# Patient Record
Sex: MALE | Race: WHITE | NOT HISPANIC OR LATINO | Employment: FULL TIME | URBAN - METROPOLITAN AREA
[De-identification: names, ages, dates, MRNs, and addresses within clinical notes are randomized per-mention and may not be internally consistent; named-entity substitution may affect disease eponyms.]

---

## 2014-05-16 RX ORDER — TRETINOIN 0.5 MG/G
CREAM TOPICAL
Qty: 45 | Refills: 6 | Status: DISCONTINUED
Start: 2014-05-16 | End: 2014-12-18

## 2014-05-16 RX ORDER — PREDNISONE 20 MG/1
TABLET ORAL
Qty: 6 | Refills: 0 | Status: DISCONTINUED
Start: 2014-05-16 | End: 2015-01-28

## 2014-05-16 RX ORDER — ISOTRETINOIN 30 MG/1
CAPSULE, LIQUID FILLED ORAL
Qty: 60 | Refills: 0 | Status: DISCONTINUED
Start: 2014-05-16 | End: 2015-01-28

## 2014-05-16 RX ORDER — ISOTRETINOIN 30 MG/1
CAPSULE, LIQUID FILLED ORAL
Qty: 60 | Refills: 0 | Status: DISCONTINUED
Start: 2014-05-16 | End: 2014-05-16

## 2020-10-07 ENCOUNTER — HOSPITAL ENCOUNTER (OUTPATIENT)
Dept: PREADMISSION TESTING | Facility: HOSPITAL | Age: 40
Discharge: HOME | End: 2020-10-07
Attending: UROLOGY

## 2020-10-07 ENCOUNTER — OFFICE VISIT CONVERTED (OUTPATIENT)
Dept: SURGERY | Facility: CLINIC | Age: 40
End: 2020-10-07
Attending: NURSE PRACTITIONER

## 2020-10-07 LAB — SARS-COV-2 RNA SPEC QL NAA+PROBE: NOT DETECTED

## 2020-10-08 ENCOUNTER — HOSPITAL ENCOUNTER (OUTPATIENT)
Dept: PERIOP | Facility: HOSPITAL | Age: 40
Setting detail: HOSPITAL OUTPATIENT SURGERY
Discharge: HOME | End: 2020-10-08
Attending: UROLOGY

## 2020-10-14 ENCOUNTER — OFFICE VISIT CONVERTED (OUTPATIENT)
Dept: SURGERY | Facility: CLINIC | Age: 40
End: 2020-10-14
Attending: NURSE PRACTITIONER

## 2020-10-15 ENCOUNTER — HOSPITAL ENCOUNTER (OUTPATIENT)
Dept: LAB | Facility: HOSPITAL | Age: 40
Discharge: HOME | End: 2020-10-15
Attending: INTERNAL MEDICINE

## 2020-10-15 LAB
ALBUMIN SERPL-MCNC: 4.1 G/DL (ref 3.5–5)
ALBUMIN/GLOB SERPL: 1.3 {RATIO} (ref 1.4–2.6)
ALP SERPL-CCNC: 70 U/L (ref 53–128)
ALT SERPL-CCNC: 15 U/L (ref 10–40)
ANION GAP SERPL CALC-SCNC: 13 MMOL/L (ref 8–19)
AST SERPL-CCNC: 12 U/L (ref 15–50)
BILIRUB SERPL-MCNC: 0.42 MG/DL (ref 0.2–1.3)
BNP SERPL-MCNC: 26 PG/ML (ref 0–450)
BUN SERPL-MCNC: 16 MG/DL (ref 5–25)
BUN/CREAT SERPL: 13 {RATIO} (ref 6–20)
CALCIUM SERPL-MCNC: 9.3 MG/DL (ref 8.7–10.4)
CHLORIDE SERPL-SCNC: 100 MMOL/L (ref 99–111)
CHOLEST SERPL-MCNC: 138 MG/DL (ref 107–200)
CHOLEST/HDLC SERPL: 4.6 {RATIO} (ref 3–6)
CONV CO2: 31 MMOL/L (ref 22–32)
CONV TOTAL PROTEIN: 7.2 G/DL (ref 6.3–8.2)
CREAT UR-MCNC: 1.2 MG/DL (ref 0.7–1.2)
GFR SERPLBLD BASED ON 1.73 SQ M-ARVRAT: >60 ML/MIN/{1.73_M2}
GLOBULIN UR ELPH-MCNC: 3.1 G/DL (ref 2–3.5)
GLUCOSE SERPL-MCNC: 100 MG/DL (ref 70–99)
HDLC SERPL-MCNC: 30 MG/DL (ref 40–60)
LDLC SERPL CALC-MCNC: 86 MG/DL (ref 70–100)
OSMOLALITY SERPL CALC.SUM OF ELEC: 291 MOSM/KG (ref 273–304)
POTASSIUM SERPL-SCNC: 4.1 MMOL/L (ref 3.5–5.3)
SODIUM SERPL-SCNC: 140 MMOL/L (ref 135–147)
T4 FREE SERPL-MCNC: 1.1 NG/DL (ref 0.9–1.8)
TRIGL SERPL-MCNC: 109 MG/DL (ref 40–150)
TSH SERPL-ACNC: 5.98 M[IU]/L (ref 0.27–4.2)
VLDLC SERPL-MCNC: 22 MG/DL (ref 5–37)

## 2020-10-19 ENCOUNTER — OFFICE VISIT CONVERTED (OUTPATIENT)
Dept: UROLOGY | Facility: CLINIC | Age: 40
End: 2020-10-19
Attending: UROLOGY

## 2020-10-19 ENCOUNTER — HOSPITAL ENCOUNTER (OUTPATIENT)
Dept: SURGERY | Facility: CLINIC | Age: 40
Discharge: HOME | End: 2020-10-19
Attending: UROLOGY

## 2020-10-19 ENCOUNTER — HOSPITAL ENCOUNTER (OUTPATIENT)
Dept: LAB | Facility: HOSPITAL | Age: 40
Discharge: HOME | End: 2020-10-19
Attending: UROLOGY

## 2020-10-19 LAB
ANION GAP SERPL CALC-SCNC: 16 MMOL/L (ref 8–19)
BASOPHILS # BLD AUTO: 0.07 10*3/UL (ref 0–0.2)
BASOPHILS NFR BLD AUTO: 0.7 % (ref 0–3)
BUN SERPL-MCNC: 17 MG/DL (ref 5–25)
BUN/CREAT SERPL: 13 {RATIO} (ref 6–20)
CALCIUM SERPL-MCNC: 10.1 MG/DL (ref 8.7–10.4)
CHLORIDE SERPL-SCNC: 100 MMOL/L (ref 99–111)
CONV ABS IMM GRAN: 0.03 10*3/UL (ref 0–0.2)
CONV CO2: 30 MMOL/L (ref 22–32)
CONV IMMATURE GRAN: 0.3 % (ref 0–1.8)
CREAT UR-MCNC: 1.35 MG/DL (ref 0.7–1.2)
DEPRECATED RDW RBC AUTO: 42.6 FL (ref 35.1–43.9)
EOSINOPHIL # BLD AUTO: 0.14 10*3/UL (ref 0–0.7)
EOSINOPHIL # BLD AUTO: 1.4 % (ref 0–7)
ERYTHROCYTE [DISTWIDTH] IN BLOOD BY AUTOMATED COUNT: 13.2 % (ref 11.6–14.4)
GFR SERPLBLD BASED ON 1.73 SQ M-ARVRAT: >60 ML/MIN/{1.73_M2}
GLUCOSE SERPL-MCNC: 134 MG/DL (ref 70–99)
HCT VFR BLD AUTO: 47.1 % (ref 42–52)
HGB BLD-MCNC: 15.3 G/DL (ref 14–18)
LYMPHOCYTES # BLD AUTO: 2.48 10*3/UL (ref 1–5)
LYMPHOCYTES NFR BLD AUTO: 24.8 % (ref 20–45)
MCH RBC QN AUTO: 28.7 PG (ref 27–31)
MCHC RBC AUTO-ENTMCNC: 32.5 G/DL (ref 33–37)
MCV RBC AUTO: 88.2 FL (ref 80–96)
MONOCYTES # BLD AUTO: 0.48 10*3/UL (ref 0.2–1.2)
MONOCYTES NFR BLD AUTO: 4.8 % (ref 3–10)
NEUTROPHILS # BLD AUTO: 6.82 10*3/UL (ref 2–8)
NEUTROPHILS NFR BLD AUTO: 68 % (ref 30–85)
NRBC CBCN: 0 % (ref 0–0.7)
OSMOLALITY SERPL CALC.SUM OF ELEC: 298 MOSM/KG (ref 273–304)
PLATELET # BLD AUTO: 342 10*3/UL (ref 130–400)
PMV BLD AUTO: 10.8 FL (ref 9.4–12.4)
POTASSIUM SERPL-SCNC: 3.9 MMOL/L (ref 3.5–5.3)
RBC # BLD AUTO: 5.34 10*6/UL (ref 4.7–6.1)
SODIUM SERPL-SCNC: 142 MMOL/L (ref 135–147)
WBC # BLD AUTO: 10.02 10*3/UL (ref 4.8–10.8)

## 2020-10-21 LAB — BACTERIA UR CULT: NORMAL

## 2020-10-22 ENCOUNTER — HOSPITAL ENCOUNTER (OUTPATIENT)
Dept: CT IMAGING | Facility: HOSPITAL | Age: 40
Discharge: HOME | End: 2020-10-22
Attending: UROLOGY

## 2021-02-23 ENCOUNTER — HOSPITAL ENCOUNTER (OUTPATIENT)
Dept: LAB | Facility: HOSPITAL | Age: 41
Discharge: HOME | End: 2021-02-23
Attending: INTERNAL MEDICINE

## 2021-02-23 LAB
ALBUMIN SERPL-MCNC: 4.2 G/DL (ref 3.5–5)
ALBUMIN/GLOB SERPL: 1.3 {RATIO} (ref 1.4–2.6)
ALP SERPL-CCNC: 75 U/L (ref 53–128)
ALT SERPL-CCNC: 34 U/L (ref 10–40)
ANION GAP SERPL CALC-SCNC: 15 MMOL/L (ref 8–19)
AST SERPL-CCNC: 20 U/L (ref 15–50)
BILIRUB SERPL-MCNC: 0.33 MG/DL (ref 0.2–1.3)
BUN SERPL-MCNC: 20 MG/DL (ref 5–25)
BUN/CREAT SERPL: 17 {RATIO} (ref 6–20)
CALCIUM SERPL-MCNC: 9.3 MG/DL (ref 8.7–10.4)
CHLORIDE SERPL-SCNC: 102 MMOL/L (ref 99–111)
CHOLEST SERPL-MCNC: 169 MG/DL (ref 107–200)
CHOLEST/HDLC SERPL: 5 {RATIO} (ref 3–6)
CONV CO2: 29 MMOL/L (ref 22–32)
CONV TOTAL PROTEIN: 7.4 G/DL (ref 6.3–8.2)
CREAT UR-MCNC: 1.16 MG/DL (ref 0.7–1.2)
GFR SERPLBLD BASED ON 1.73 SQ M-ARVRAT: >60 ML/MIN/{1.73_M2}
GLOBULIN UR ELPH-MCNC: 3.2 G/DL (ref 2–3.5)
GLUCOSE SERPL-MCNC: 124 MG/DL (ref 70–99)
HDLC SERPL-MCNC: 34 MG/DL (ref 40–60)
LDLC SERPL CALC-MCNC: 115 MG/DL (ref 70–100)
OSMOLALITY SERPL CALC.SUM OF ELEC: 298 MOSM/KG (ref 273–304)
POTASSIUM SERPL-SCNC: 4.2 MMOL/L (ref 3.5–5.3)
SODIUM SERPL-SCNC: 142 MMOL/L (ref 135–147)
TRIGL SERPL-MCNC: 99 MG/DL (ref 40–150)
VLDLC SERPL-MCNC: 20 MG/DL (ref 5–37)

## 2021-03-05 ENCOUNTER — OFFICE VISIT CONVERTED (OUTPATIENT)
Dept: INTERNAL MEDICINE | Facility: CLINIC | Age: 41
End: 2021-03-05
Attending: STUDENT IN AN ORGANIZED HEALTH CARE EDUCATION/TRAINING PROGRAM

## 2021-03-05 ENCOUNTER — CONVERSION ENCOUNTER (OUTPATIENT)
Dept: INTERNAL MEDICINE | Facility: CLINIC | Age: 41
End: 2021-03-05

## 2021-05-10 NOTE — H&P
"   History and Physical      Patient Name: Sky Reeves   Patient ID: [id number]   Sex: Male   YOB: 1980    Primary Care Provider: Van Albert MD    Visit Date: March 5, 2021    Provider: Van Albert MD   Location: [facility] [place]   Location Address: [address] [place] [address]; [address]  [place]  [address]   Location Phone: [phone]          Chief Complaint  · Establish care  · Depression  · PTSD      History Of Present Illness  Sky Reeves is a 40 year old /White male who presents for evaluation and treatment of:      here to establish care.  Last PCP- Dr Huerta.  Psych NP is Martin Nolan.  Reports considering switching PCPs.    Reports working on \"medical correction\" and unsatisfied with medical documentation of his current PCP.  Medical problems as follows.    LBP:  reports present 8-10 years.  Taking naproxen and tramadol  Reports he is concerned he is developing arthritis in hands as he is having trouble closing hands in the morning.  Reports mother has rheumatoid arthritis.  Reports LBP will at times radiate down right leg.    PTSD/anxiety disorder:    Has been seeing Mando Robles (spelling?) of [facility] since November.  Mando Nolan, a psych NP, who prescribed his psychiatric medications.  He did go to [facility] last fall for several months, but left their care as he felt they were not a good fit.    He denies any SI, and denies any previous suicide attempts.    HTN:      does not measure BP at home.      Obesity:    does not engage in regular physical exercise.  He is unaware of what size dress shirt he wears.  He reports that his ex-wife (whom he is currently living with) reports that he snores at night.  He reports feeling daytime fatigue.  Has never had a sleep study    Unspecified cardiac issue:    reports seeing a cardiologist (he is unsure of the name) for a fast heart beat.    Social History:    quit smoking 20 years " ago  denies ETOH or illicits  lives with ex-wife (they are trying to repair their relationship)    Health Maintenance:  Never had colonoscopy  Declines flu shot  Reports had labs performed 2 weeks ago by cardiologist       Past Medical History  Disease Name Date Onset Notes   Allergic rhinitis --  --    Arthritis --  --    Depression --  --    Essential hypertension 12/01/2015 --    High blood pressure --  --    Hypertension --  --    Kidney stones --  --    Low back pain --  --    Other seasonal allergic rhinitis 12/01/2015 --          Past Surgical History  Procedure Name Date Notes   Cystoscopy and ureteroscopy with retrograde pyelography or stent placement --  --          Medication List  Name Date Started Instructions   amlodipine 10 mg oral tablet  take 1 tablet (10 mg) by oral route once daily   buspirone 10 mg oral tablet  take 1 tablet (10 mg) by oral route 2 times per day   lisinopril-hydrochlorothiazide 20-12.5 mg oral tablet 03/05/2021 take 2 tablets by oral route once daily   metoprolol tartrate 50 mg oral tablet 03/05/2021 take 1 tablet by oral route 2 times a day for 30 days   naproxen 500 mg oral tablet 12/01/2015 TAKE ONE TABLET BY MOUTH TWICE A DAY WITH FOOD.   quetiapine 25 mg oral tablet  --    tramadol 50 mg oral tablet 03/05/2021 take 2 tablet by oral route in AM, 1 at noon and 2 at night.         Allergy List  Allergen Name Date Reaction Notes   NO KNOWN DRUG ALLERGIES --  --  --        Allergies Reconciled  Family Medical History  Disease Name Relative/Age Notes   -None  --    -Father's Family History Unknown Father/   Father   -Mother's Family History Unknown Mother/   Mother         Social History  Finding Status Start/Stop Quantity Notes   Alcohol Never --/-- --  --    Tobacco Former 16/26 1 PK PER DAY --          Review of Systems  · Constitutional  o Denies  o : fever, fatigue, weight loss, weight gain  · Cardiovascular  o Denies  o : lower extremity edema, claudication, chest pressure,  "palpitations  · Respiratory  o Denies  o : shortness of breath, wheezing, cough, hemoptysis, dyspnea on exertion  · Gastrointestinal  o Denies  o : nausea, vomiting, diarrhea, constipation, abdominal pain      Vitals  Date Time BP Position Site L\R Cuff Size HR RR TEMP (F) WT  HT  BMI kg/m2 BSA m2 O2 Sat FR L/min FiO2 HC       10/14/2020 01:27 PM       16  294lbs 16oz 6'  4\" 35.91 2.68       10/19/2020 12:22 PM       17  294lbs 16oz 6'  4\" 35.91 2.68       03/05/2021 11:27 /99 Sitting    85 - R  97.8 315lbs 16oz 6'  4\" 38.46 2.77 96 %  21%          Physical Examination     gen: NAD, well nourished  HEENT: NCAT, PERRL, EOMI, MMM w/ no erythema or exudate in oropharynx  Neck: no cervical LAD, no thyromegaly or palpable nodules  CV: RRR w/ no m/r/g  Resp: CTAB, nonlabored breathing  GI: soft, NTTP, on masses or HSM  Ext: no LE edema  MSK: no TTP T-spine or L-spine.  No TTP paraspinal muscles.  Negative straight leg test bilaterally  Neuro: 1+ patellar reflexes, AAO and answers questions appropriately, CN III IV and VI intact  Derm: no rash or lesions           Assessment  · Need for influenza vaccination     V04.81/Z23  -declines flu shot  · Annual physical exam     V70.0/Z00.00  -recommended PT for LBP, and sleep study as high index of suspicion for ANAYA  -reports would like to consider recommendations, and whether or not wishes to transfer care from Dr. Huerta to myself  -reports recent bloodwork at cardiology; will try to obtain  · Essential hypertension     401.9/I10  -above goal of less than 130/80  -compliant with medication  -recommended check BP at home or pharmacy 3 times a week for at least two weeks  -would adjust medication if home readings above goal  · Obesity     278.00/E66.9  -recommended sleep study  -if wishes to pursue care with me, would refer for sleep study  -will try to obtain cardiology labs and records  · Low back pain     724.2/M54.5  -negative straight leg test  -on naproxen and " tramadol  -recommended PT, says will consider recommendation    Problems Reconciled  Plan  · Orders  o ACO-14: Influenza immunization was not administered for reasons documented () - V04.81/Z23 - 03/05/2021  o ACO-39: Current medications updated and reviewed (, 1159F) - - 03/05/2021  · Medications  o Medications have been Reconciled  o Transition of Care or Provider Policy  · Instructions  o Flu vaccine declined.  o Reviewed health maintenance flowsheet and updated information. Orders were placed and/or patient's response was documented.  o Patient advised to monitor blood pressure (B/P) at home and journal readings. Patient informed that a B/P reading at home of more than 130/80 is considered hypertension. For readings greater fgez210/90 or higher patient is advised to follow up in the office with readings for management. Patient advised to limit sodium intake.  o Patient was educated/instructed on their diagnosis, treatment and medications prior to discharge from the clinic today.  · Disposition  o Return Visit Request in/on 1 month +/- 2 days (23214).            Electronically Signed by: Van Albert MD -Author on March 5, 2021 06:33:05 PM

## 2021-05-10 NOTE — H&P
History and Physical      Patient Name: Sky Reeves   Patient ID: [id number]   Sex: Male   YOB: 1980    Primary Care Provider: Carrie Huerta MD    Visit Date: October 7, 2020    Provider: BERNARD Martin   Location: [facility] [place] [facility]   Location Address: [address] [place] [address]  [id number]   Location Phone: [phone]          Chief Complaint  · Outpatient History & Physical / Surgical Orders      History Of Present Illness  [facility]  Outpatient History and Physical Surgical Orders  Preadmission Location: LOCATION Preadmission Time: TIME   Which Facility: [facility] Surgery Date: 10/08/2020 Preadmission Testing Date: 10/7/2020   Patient's Name: Sky Reeves YOB: 1980   Chief complaint/history present illness: Nephrolithiasis   Current Medication List: amlodipine 10 mg oral tablet, duloxetine 20 mg oral capsule,delayed release(DR/EC), Flomax 0.4 mg oral capsule, hydroxyzine HCl 10 mg oral tablet, ketorolac 10 mg oral tablet, lisinopril-hydrochlorothiazide 20-12.5 mg oral tablet, metoprolol tartrate 50 mg oral tablet, naproxen 500 mg oral tablet, ondansetron 4 mg oral tablet,disintegrating, and tramadol 50 mg oral tablet   Allergies: NO KNOWN DRUG ALLERGIES   Significant past medical: Allergic rhinitis, Arthritis, Depression, Essential hypertension, High blood pressure, Hypertension, Kidney stones, Low back pain, and Other seasonal allergic rhinitis   Past Surgical History: *No Past Surgical History   Examination of heart and lungs: Regular rate, rhythm, no murmur, gallop, rub, Breath sounds normal, no distress, and Abdomen soft, non-tender, BSx4 are positive         Past Medical History  Disease Name Date Onset Notes   Allergic rhinitis --  --    Arthritis --  --    Depression --  --    Essential hypertension 12/01/2015 --    High blood pressure --  --    Hypertension --  --    Kidney stones --  --    Low back pain --  --    Other  "seasonal allergic rhinitis 12/01/2015 --          Past Surgical History  Procedure Name Date Notes   *No Past Surgical History --  --          Medication List  Name Date Started Instructions   amlodipine 10 mg oral tablet  take 1 tablet (10 mg) by oral route once daily   duloxetine 20 mg oral capsule,delayed release(DR/EC)  take 1 capsule (20 mg) by oral route 2 times per day   Flomax 0.4 mg oral capsule  take 1 capsule (0.4 mg) by oral route once daily 1/2 hour following the same meal each day   hydroxyzine HCl 10 mg oral tablet  take 1 tablet by oral route daily   ketorolac 10 mg oral tablet  --    lisinopril-hydrochlorothiazide 20-12.5 mg oral tablet 12/01/2015 take 1 tablet by oral route once daily for 90 days   metoprolol tartrate 50 mg oral tablet  --    naproxen 500 mg oral tablet 12/01/2015 TAKE ONE TABLET BY MOUTH TWICE A DAY WITH FOOD.   ondansetron 4 mg oral tablet,disintegrating  --    tramadol 50 mg oral tablet 12/01/2015 take 1 tablet by oral route in AM, 1 at noon and 2 at night.         Allergy List  Allergen Name Date Reaction Notes   NO KNOWN DRUG ALLERGIES --  --  --        Allergies Reconciled  Family Medical History  Disease Name Relative/Age Notes   -None  --    -Father's Family History Unknown Father/   Father   -Mother's Family History Unknown Mother/   Mother         Social History  Finding Status Start/Stop Quantity Notes   Alcohol Never --/-- --  --    Tobacco Former 16/26 1 PK PER DAY --          Review of Systems  · Constitutional  o Denies  o : chills, fever  · Genitourinary  o Admits  o : burning with urination, frequency of urine, urgency with urine, kidney stones      Vitals  Date Time BP Position Site L\R Cuff Size HR RR TEMP (F) WT  HT  BMI kg/m2 BSA m2 O2 Sat FR L/min FiO2 HC       10/07/2020 11:51 AM       16  295lbs 8oz 6'  4\" 35.97 2.68                 Assessment  · Pre-Surgical Orders     V72.84  · Preop testing     V72.84/Z01.818    Problems " Reconciled  Plan  · Orders  o General Urology Surgery Order (UROSU) - V72.84 - 10/08/2020  o [facility] Pre-Op Covid-19 Screening (75588) - V72.84/Z01.818 - 10/07/2020  · Medications  o Medications have been Reconciled  o Transition of Care or Provider Policy  · Instructions  o Pre-Operative Orders: Sign permit for cystoscopy with left ureteroscopy with laser and left ureteral stent placement for Dr. Martin Wren.   o Outpatient   o IV Fluids: LR @ 100 cc/hour  o Levaquin 500 mg IV OCTOR.  o RISK AND BENEFITS:  o Possible risks/complications, benefits and alternatives to surgical or invasive procedure have been explained to patient and/or legal guardian.            Electronically Signed by: BERNARD Martin -Author on October 7, 2020 01:16:50 PM  Electronically Co-signed by: Martin Wren MD -Reviewer on October 7, 2020 01:41:18 PM

## 2021-05-10 NOTE — H&P
History and Physical      Patient Name: Sky Reeves   Patient ID: [id number]   Sex: Male   YOB: 1980    Primary Care Provider: Carrie Huerta MD    Visit Date: October 7, 2020    Provider: BERNARD Martin   Location: [facility]   Location Address: [address] [place] [address]  [id number]   Location Phone: [phone]          Chief Complaint  · Microscopic hematuria  · frequency  · urgency      History Of Present Illness  He was found to have microhematuria in [facility] ER on 10/5/2020. He has not had a workup for hematuria in the past. He denies any UTI's within the past year. He admits to frequency, urgency, & dysuria. He does not have a history of kidney stones. Patient admits to left flank and left lower abdominal pain. Denies any fever or chills.      Ct abd/pelvis with contrast reveals:  1.  Obstructive uropathy on the left due to UPJ calculus measuring about 6 mm.  2.  Moderate to severe left hydronephrosis with left-sided perinephric and periureteral inflammatory stranding and delay in function of the left kidney.  3.  No gas seen in the left pelvicaliceal system or the left ureter.  4.  Acute pyelonephritis is not clearly identified on the left or right but cannot be excluded.  5. There is pelvic phleboliths.  6.  No urinary bladder calculi or urinary bladder wall thickness is seen.       Past Medical History  Disease Name Date Onset Notes   Allergic rhinitis --  --    Arthritis --  --    Depression --  --    Essential hypertension 12/01/2015 --    High blood pressure --  --    Hypertension --  --    Kidney stones --  --    Low back pain --  --    Other seasonal allergic rhinitis 12/01/2015 --          Past Surgical History  Procedure Name Date Notes   *No Past Surgical History --  --          Medication List  Name Date Started Instructions   amlodipine 10 mg oral tablet  take 1 tablet (10 mg) by oral route once daily   duloxetine 20 mg oral capsule,delayed  "release(DR/EC)  take 1 capsule (20 mg) by oral route 2 times per day   Flomax 0.4 mg oral capsule  take 1 capsule (0.4 mg) by oral route once daily 1/2 hour following the same meal each day   hydroxyzine HCl 10 mg oral tablet  take 1 tablet by oral route daily   ketorolac 10 mg oral tablet  --    lisinopril-hydrochlorothiazide 20-12.5 mg oral tablet 12/01/2015 take 1 tablet by oral route once daily for 90 days   metoprolol tartrate 50 mg oral tablet  --    naproxen 500 mg oral tablet 12/01/2015 TAKE ONE TABLET BY MOUTH TWICE A DAY WITH FOOD.   ondansetron 4 mg oral tablet,disintegrating  --    tramadol 50 mg oral tablet 12/01/2015 take 1 tablet by oral route in AM, 1 at noon and 2 at night.         Allergy List  Allergen Name Date Reaction Notes   NO KNOWN DRUG ALLERGIES --  --  --        Allergies Reconciled  Family Medical History  Disease Name Relative/Age Notes   -None  --    -Father's Family History Unknown Father/   Father   -Mother's Family History Unknown Mother/   Mother         Social History  Finding Status Start/Stop Quantity Notes   Alcohol Never --/-- --  --    Tobacco Former 16/26 1 PK PER DAY --          Review of Systems  · Constitutional  o Denies  o : fever, chills  · Cardiovascular  o Denies  o : reviewed and unchanged  · Genitourinary  o Admits  o : urgency, frequency, dysuria  o Denies  o : nocturia, hematuria, oliguria, change in urine color, incontinence, urinary retention, difficulty voiding, urinary hesitancy, decreased stream  · Endocrine  o Denies  o : weight loss, reviewed and unchanged      Vitals  Date Time BP Position Site L\R Cuff Size HR RR TEMP (F) WT  HT  BMI kg/m2 BSA m2 O2 Sat FR L/min FiO2 HC       10/07/2020 11:51 AM       16  295lbs 8oz 6'  4\" 35.97 2.68             Physical Examination  · Constitutional  o Appearance  o : Well nourished, well groomed. Atraumatic.           Results  · In-Office Procedures  o Lab procedure  § Automated Dipstick Urinalysis (Surg Spec) WITHOUT " Micro [facility] (26551)   § Color Ur: Yellow   § Clarity Ur: Clear   § Glucose Ur Ql Strip: Negative   § Bilirub Ur Ql Strip: Negative   § Ketones Ur Ql Strip: Negative   § Sp Gr Ur Qn: 1.015   § Hgb Ur Ql Strip: Small   § pH Ur-LsCnc: 6.0   § Prot Ur Ql Strip: Negative   § Urobilinogen Ur Strip-mCnc: 0.2 E.U./dL   § Nitrite Ur Ql Strip: Negative   § WBC Est Ur Ql Strip: Negative       Assessment  · Microhematuria     599.72/R31.29  · Abdominal Pain     789.00/R10.9  · Left ureteral calculus     592.1/N20.1  · Left flank pain     789.09/R10.9    Problems Reconciled  Plan  · Medications  o Medications have been Reconciled  o Transition of Care or Provider Policy  · Instructions  o Schedule for Cystoscopy with left ureteroscopy with laser and left ureteral stent placement with Dr. Martin Wren on 10/8/2020.   o Educated the patient on if he gets a fever greater than 101.0 and unable to void to proceed to ER.   o Electronically Identified Patient Education Materials Provided Electronically  · Disposition  o Call or Return if symptoms worsen or persist.            Electronically Signed by: BERNARD Martin -Author on October 7, 2020 01:15:00 PM

## 2021-05-10 NOTE — H&P
History and Physical      Patient Name: Sky Reeves   Patient ID: [id number]   Sex: Male   YOB: 1980    Primary Care Provider: Carrie Huerta MD    Visit Date: October 14, 2020    Provider: BERNARD Martin   Location: [facility] [place] [facility]   Location Address: [address] [place] [address]  [id number]   Location Phone: [phone]          Chief Complaint  · Follow Up      History Of Present Illness  Sky Reeves is a 40 year old /White male who is here to follow up status post surgery.      Patient presents today with complaints of severe left flank pain and gross hematuria after undergoing a cysto with Left URS with left stent placement on 10/8/2020 performed by Dr. Martin Wren. On 10/11 patient states that he accidently got the string of the stent stuck in his zipper and pulled the stent out. He proceeded to [facility] ER.  He has been with left flank pain since then. Denies any fever or chills.    Abd x-ray form ER:   1. No discrete residual calculus overlying the left renal fossa or the expected course of the   ureter.  2. Left nephroureteral stent has been removed.  3. Nonobstructive bowel gas pattern.    Was started on Cipro and discharge home to follow-up here in the office.             Past Medical History  Disease Name Date Onset Notes   Allergic rhinitis --  --    Arthritis --  --    Depression --  --    Essential hypertension 12/01/2015 --    High blood pressure --  --    Hypertension --  --    Kidney stones --  --    Low back pain --  --    Other seasonal allergic rhinitis 12/01/2015 --          Past Surgical History  Procedure Name Date Notes   *No Past Surgical History --  --          Medication List  Name Date Started Instructions   amlodipine 10 mg oral tablet  take 1 tablet (10 mg) by oral route once daily   duloxetine 20 mg oral capsule,delayed release(DR/EC)  take 1 capsule (20 mg) by oral route 2 times per day   Flomax 0.4 mg oral capsule  take 1 capsule (0.4 mg)  by oral route once daily 1/2 hour following the same meal each day   hydroxyzine HCl 10 mg oral tablet  take 1 tablet by oral route daily   ketorolac 10 mg oral tablet 10/14/2020 take 1 tablet by oral route every 6 - 8 hours as needed for pain.   lisinopril-hydrochlorothiazide 20-12.5 mg oral tablet 12/01/2015 take 1 tablet by oral route once daily for 90 days   metoprolol tartrate 50 mg oral tablet  --    naproxen 500 mg oral tablet 12/01/2015 TAKE ONE TABLET BY MOUTH TWICE A DAY WITH FOOD.   ondansetron 4 mg oral tablet,disintegrating  --    oxycodone-acetaminophen 7.5-325 mg oral tablet 10/14/2020 take 1 tablet by oral route every 6 hours as needed   tramadol 50 mg oral tablet 12/01/2015 take 1 tablet by oral route in AM, 1 at noon and 2 at night.         Allergy List  Allergen Name Date Reaction Notes   NO KNOWN DRUG ALLERGIES --  --  --        Allergies Reconciled  Family Medical History  Disease Name Relative/Age Notes   -None  --    -Father's Family History Unknown Father/   Father   -Mother's Family History Unknown Mother/   Mother         Social History  Finding Status Start/Stop Quantity Notes   Alcohol Never --/-- --  --    Tobacco Former 16/26 1 PK PER DAY --          Review of Systems  · Constitutional  o Denies  o : chills, fever  · Eyes  o Denies  o : yellowish discoloration of eyes  · HENT  o Denies  o : difficulty swallowing  · Cardiovascular  o Denies  o : chest pain on exertion  · Respiratory  o Denies  o : shortness of breath  · Gastrointestinal  o Denies  o : nausea, vomiting, diarrhea, constipation  · Genitourinary  o Admits  o : hematuria  o Denies  o : urgency, frequency, dysuria, nocturia, oliguria, change in urine color, difficulty voiding, abnormal color of urine  · Integument  o Denies  o : rash  · Neurologic  o Denies  o : tingling or numbness  · Musculoskeletal  o Denies  o : joint pain  · Endocrine  o Denies  o : weight gain, weight loss      Vitals  Date Time BP Position Site L\R Cuff  "Size HR RR TEMP (F) WT  HT  BMI kg/m2 BSA m2 O2 Sat FR L/min FiO2        10/14/2020 01:27 PM       16  294lbs 16oz 6'  4\" 35.91 2.68             Physical Examination  · Constitutional  o Appearance  o : well developed, well-nourished, patient in no apparent distress  · Head and Face  o Head  o :   § Inspection  § : atraumatic, normocephalic  o Face  o :   § Inspection  § : no facial lesions  · Eyes  o Conjunctivae  o : conjunctivae normal  o Sclerae  o : sclerae white  · Neck  o Inspection/Palpation  o : normal appearance, no masses or tenderness, trachea midline  · Respiratory  o Respiratory Effort  o : breathing unlabored  · Skin and Subcutaneous Tissue  o General Inspection  o : no lesions present, no areas of discoloration, skin turgor normal, texture normal  · Neurologic  o Mental Status Examination  o :   § Orientation  § : grossly oriented to person, place and time  § Attention  § : attention normal, concentration abilities normal  § Fund of Knowledge  § : fund of knowledge within normal limits, patient aware of current events  o Gait and Station  o : normal gait, able to stand without difficulty  · Psychiatric  o Judgement and Insight  o : judgment and insight intact  o Mood and Affect  o : mood normal, affect appropriate          Results  · In-Office Procedures  o Lab procedure  § Automated Dipstick Urinalysis (Surg Spec) WITHOUT Micro [facility] (22420)   § Color Ur: Yellow   § Clarity Ur: Clear   § Glucose Ur Ql Strip: Negative   § Bilirub Ur Ql Strip: Negative   § Ketones Ur Ql Strip: Negative   § Sp Gr Ur Qn: 1.010   § Hgb Ur Ql Strip: Large   § pH Ur-LsCnc: 6.5   § Prot Ur Ql Strip: Trace   § Urobilinogen Ur Strip-mCnc: 0.2 E.U./dL   § Nitrite Ur Ql Strip: Negative   § WBC Est Ur Ql Strip: Trace       Assessment  · Postoperative Exam Following Surgery     V67.00/Z09  · Renal colic on left side     788.0/N23  · Gross hematuria     599.71/R31.0  · S/P cystoscopy with ureteral stent " placement     V45.89/Z96.0    Problems Reconciled  Plan  · Medications  o Medications have been Reconciled  o Transition of Care or Provider Policy  · Instructions  o Patient instructed if no improvement in symptoms by Friday to notify the office.   o Keep follow-up appt. with Dr. Martin Wren.   o Continue and complete all antibiotics.   o Instructed in unable to void or temp greater than 101 to proceed to ER.   o Additional pain script given at this office visit.   o Educate the patient on renal colic and how it can be s/e of having stent pulled. Work note given for Tues 10/13 and may return back on Monday 10/19.   o Electronically Identified Patient Education Materials Provided Electronically  · Disposition  o Call or Return if symptoms worsen or persist.            Electronically Signed by: BERNARD Martin -Author on October 15, 2020 03:24:59 PM

## 2021-05-12 ENCOUNTER — BOTOX (OUTPATIENT)
Dept: URBAN - METROPOLITAN AREA CLINIC 20 | Facility: CLINIC | Age: 41
Setting detail: DERMATOLOGY
End: 2021-05-12

## 2021-05-12 DIAGNOSIS — L57.0 ACTINIC KERATOSIS: ICD-10-CM

## 2021-05-12 PROCEDURE — OTHER BEL BOTOX COSMETIC: OTHER

## 2021-05-13 NOTE — PROGRESS NOTES
Progress Note      Patient Name: Sky Reeves   Patient ID: [id number]   Sex: Male   YOB: 1980    Primary Care Provider: Carrie Huerta MD    Visit Date: October 19, 2020    Provider: Martin Wren MD   Location: [facility]   Location Address: [address] [place] [address]  [id number]   Location Phone: [phone]          Chief Complaint  · pt here for urologic issues      History Of Present Illness     40-year-old gentleman status post left ureteroscopy with intermittent GH with clots.    10/8/2020 cystoscopy left ureteroscopy with stentno stone was found.  Short strictured area at the top of the right ureter at the UPJ.  Ureteroscopy would go through.    stent is out.    10/20  CT A/P w - 6mm stone UPJ on the left.    Went back to ER 4 days after surgery and one day after stent removal.      Patient is having intermittent gross hematuria clot to last few days.  Still with bothersome pain in his left.    No fevers or chills.      Pt still having pain in the     No history of kidney stone.    No urologic family history    No cardiopulmonary history.  Patient does not smoke.  Patient does not use blood thinner.             Past Medical History  Allergic rhinitis; Arthritis; Depression; Essential hypertension; High blood pressure; Hypertension; Kidney stones; Low back pain; Other seasonal allergic rhinitis         Past Surgical History  Cystoscopy and ureteroscopy with retrograde pyelography or stent placement         Medication List  amlodipine 10 mg oral tablet; duloxetine 20 mg oral capsule,delayed release(DR/EC); Flomax 0.4 mg oral capsule; hydroxyzine HCl 10 mg oral tablet; lisinopril-hydrochlorothiazide 20-12.5 mg oral tablet; metoprolol tartrate 50 mg oral tablet; naproxen 500 mg oral tablet; ondansetron 4 mg oral tablet,disintegrating; oxycodone-acetaminophen 7.5-325 mg oral tablet; tramadol 50 mg oral tablet         Allergy List  NO KNOWN DRUG ALLERGIES  "      Allergies Reconciled  Family Medical History  -None; -Father's Family History Unknown; -Mother's Family History Unknown         Social History  Alcohol (Never); Tobacco (Former)         Review of Systems  · Constitutional  o Denies  o : chills  · Gastrointestinal  o Denies  o : nausea      Vitals  Date Time BP Position Site L\R Cuff Size HR RR TEMP (F) WT  HT  BMI kg/m2 BSA m2 O2 Sat FR L/min FiO2        10/19/2020 12:22 PM       17  294lbs 16oz 6'  4\" 35.91 2.68             Physical Examination  · Constitutional  o Appearance  o : Well-appearing, well-developed, in no acute distress  · Respiratory  o Respiratory Effort  o : Unlabored breathing  · Cardiovascular  o Heart  o :   § Auscultation of Heart  § : Regular rate and rhythm, no murmurs  · Gastrointestinal  o Abdominal Examination  o : Nontender, nondistended, no rigidity or guarding, no hepatosplenomegaly  · Neurologic  o Mental Status Examination  o :   § Orientation  § : Grossly oriented to person, place and time, judgment and insight intact, normal mood and affect              Assessment  · Nephrolithiasis     592.0/N20.0      Plan  · Medications  o Medications have been Reconciled  o Transition of Care or Provider Policy  · Instructions  o Electronically Identified Patient Education Materials Provided Electronically         Having bothersome pain intermittently and severe gross hematuria with clots.  Discussion risk benefits I will get him set up for urine culture, BMP, CBC and CT urology protocol make sure there is no obstructing stone still in place.    No stone was found at time of surgery we discussed there is always possibility that could not have found it but it can still be there been a problem.  Follow-up after for telehealth visit    Patient understands he has a fever greater than 100.5, intractable nausea/vomiting tract pain she will emergency room.                 Electronically Signed by: Martin Wren MD -Author on October 21, " 2020 09:42:19 AM

## 2021-05-14 VITALS — RESPIRATION RATE: 16 BRPM | HEIGHT: 76 IN | WEIGHT: 295 LBS | BODY MASS INDEX: 35.92 KG/M2

## 2021-05-14 VITALS — RESPIRATION RATE: 17 BRPM | BODY MASS INDEX: 35.92 KG/M2 | WEIGHT: 295 LBS | HEIGHT: 76 IN

## 2021-05-14 VITALS — HEIGHT: 76 IN | BODY MASS INDEX: 35.98 KG/M2 | WEIGHT: 295.5 LBS | RESPIRATION RATE: 16 BRPM

## 2021-05-14 VITALS
TEMPERATURE: 97.8 F | OXYGEN SATURATION: 96 % | DIASTOLIC BLOOD PRESSURE: 99 MMHG | HEIGHT: 76 IN | SYSTOLIC BLOOD PRESSURE: 162 MMHG | BODY MASS INDEX: 38.36 KG/M2 | WEIGHT: 315 LBS | HEART RATE: 85 BPM

## 2021-05-20 ENCOUNTER — BOTOX (OUTPATIENT)
Dept: URBAN - METROPOLITAN AREA CLINIC 20 | Facility: CLINIC | Age: 41
Setting detail: DERMATOLOGY
End: 2021-05-20

## 2021-05-20 DIAGNOSIS — L70.0 ACNE VULGARIS: ICD-10-CM

## 2021-08-27 ENCOUNTER — LAB (OUTPATIENT)
Dept: LAB | Facility: HOSPITAL | Age: 41
End: 2021-08-27

## 2021-08-27 ENCOUNTER — TRANSCRIBE ORDERS (OUTPATIENT)
Dept: LAB | Facility: HOSPITAL | Age: 41
End: 2021-08-27

## 2021-08-27 DIAGNOSIS — R07.9 CHEST PAIN, UNSPECIFIED TYPE: Primary | ICD-10-CM

## 2021-08-27 DIAGNOSIS — R07.9 CHEST PAIN, UNSPECIFIED TYPE: ICD-10-CM

## 2021-08-27 LAB
ALBUMIN SERPL-MCNC: 4.6 G/DL (ref 3.5–5.2)
ALBUMIN/GLOB SERPL: 1.5 G/DL
ALP SERPL-CCNC: 71 U/L (ref 39–117)
ALT SERPL W P-5'-P-CCNC: 33 U/L (ref 1–41)
ANION GAP SERPL CALCULATED.3IONS-SCNC: 13 MMOL/L (ref 5–15)
AST SERPL-CCNC: 20 U/L (ref 1–40)
BILIRUB SERPL-MCNC: 0.2 MG/DL (ref 0–1.2)
BUN SERPL-MCNC: 23 MG/DL (ref 6–20)
BUN/CREAT SERPL: 19.3 (ref 7–25)
CALCIUM SPEC-SCNC: 9.3 MG/DL (ref 8.6–10.5)
CHLORIDE SERPL-SCNC: 100 MMOL/L (ref 98–107)
CHOLEST SERPL-MCNC: 157 MG/DL (ref 0–200)
CO2 SERPL-SCNC: 28 MMOL/L (ref 22–29)
CREAT SERPL-MCNC: 1.19 MG/DL (ref 0.76–1.27)
GFR SERPL CREATININE-BSD FRML MDRD: 67 ML/MIN/1.73
GLOBULIN UR ELPH-MCNC: 3 GM/DL
GLUCOSE SERPL-MCNC: 132 MG/DL (ref 65–99)
HDLC SERPL-MCNC: 29 MG/DL (ref 40–60)
LDLC SERPL CALC-MCNC: 82 MG/DL (ref 0–100)
LDLC/HDLC SERPL: 2.52 {RATIO}
POTASSIUM SERPL-SCNC: 3.8 MMOL/L (ref 3.5–5.2)
PROT SERPL-MCNC: 7.6 G/DL (ref 6–8.5)
SODIUM SERPL-SCNC: 141 MMOL/L (ref 136–145)
TRIGL SERPL-MCNC: 275 MG/DL (ref 0–150)
VLDLC SERPL-MCNC: 46 MG/DL (ref 5–40)

## 2021-08-27 PROCEDURE — 36415 COLL VENOUS BLD VENIPUNCTURE: CPT

## 2021-08-27 PROCEDURE — 80061 LIPID PANEL: CPT

## 2021-08-27 PROCEDURE — 80053 COMPREHEN METABOLIC PANEL: CPT

## 2021-12-07 ENCOUNTER — COMPLETE SKIN EXAM (OUTPATIENT)
Dept: URBAN - METROPOLITAN AREA CLINIC 20 | Facility: CLINIC | Age: 41
Setting detail: DERMATOLOGY
End: 2021-12-07

## 2021-12-07 ENCOUNTER — RX ONLY (RX ONLY)
Age: 41
End: 2021-12-07

## 2021-12-07 DIAGNOSIS — D18.01 HEMANGIOMA OF SKIN AND SUBCUTANEOUS TISSUE: ICD-10-CM

## 2021-12-07 DIAGNOSIS — L82.1 OTHER SEBORRHEIC KERATOSIS: ICD-10-CM

## 2021-12-07 DIAGNOSIS — L81.4 OTHER MELANIN HYPERPIGMENTATION: ICD-10-CM

## 2021-12-07 DIAGNOSIS — L57.8 OTHER SKIN CHANGES DUE TO CHRONIC EXPOSURE TO NONIONIZING RADIATION: ICD-10-CM

## 2021-12-07 DIAGNOSIS — D22.9 MELANOCYTIC NEVI, UNSPECIFIED: ICD-10-CM

## 2021-12-07 PROBLEM — L73.9 FOLLICULAR DISORDER, UNSPECIFIED: Status: RESOLVED | Noted: 2021-12-07

## 2021-12-07 PROBLEM — I78.9 DISEASE OF CAPILLARIES, UNSPECIFIED: Status: RESOLVED | Noted: 2021-12-07

## 2021-12-07 PROCEDURE — 99214 OFFICE O/P EST MOD 30 MIN: CPT

## 2021-12-07 RX ORDER — DAPSONE 75 MG/G
1 A SMALL AMOUNT GEL TOPICAL EVERY MORNING
Qty: 90 | Refills: 6
Start: 2021-12-07

## 2021-12-21 ENCOUNTER — LASER (OUTPATIENT)
Dept: URBAN - METROPOLITAN AREA CLINIC 20 | Facility: CLINIC | Age: 41
Setting detail: DERMATOLOGY
End: 2021-12-21

## 2021-12-21 DIAGNOSIS — L57.0 ACTINIC KERATOSIS: ICD-10-CM

## 2021-12-21 PROCEDURE — OTHER BEL - V: OTHER

## 2022-04-12 RX ORDER — SPIRONOLACTONE 25 MG/1
3 TABLET TABLET, FILM COATED ORAL ONCE A DAY
Qty: 270 | Refills: 3
Start: 2022-04-12

## 2022-07-05 ENCOUNTER — TRANSCRIBE ORDERS (OUTPATIENT)
Dept: LAB | Facility: HOSPITAL | Age: 42
End: 2022-07-05

## 2022-07-05 ENCOUNTER — LAB (OUTPATIENT)
Dept: LAB | Facility: HOSPITAL | Age: 42
End: 2022-07-05

## 2022-07-05 DIAGNOSIS — R07.9 CHEST PAIN, UNSPECIFIED TYPE: Primary | ICD-10-CM

## 2022-07-05 DIAGNOSIS — R07.9 CHEST PAIN, UNSPECIFIED TYPE: ICD-10-CM

## 2022-07-05 LAB
ALBUMIN SERPL-MCNC: 4.4 G/DL (ref 3.5–5.2)
ALBUMIN/GLOB SERPL: 1.6 G/DL
ALP SERPL-CCNC: 70 U/L (ref 39–117)
ALT SERPL W P-5'-P-CCNC: 16 U/L (ref 1–41)
ANION GAP SERPL CALCULATED.3IONS-SCNC: 11.7 MMOL/L (ref 5–15)
AST SERPL-CCNC: 11 U/L (ref 1–40)
BILIRUB SERPL-MCNC: 0.4 MG/DL (ref 0–1.2)
BUN SERPL-MCNC: 26 MG/DL (ref 6–20)
BUN/CREAT SERPL: 24.8 (ref 7–25)
CALCIUM SPEC-SCNC: 9.6 MG/DL (ref 8.6–10.5)
CHLORIDE SERPL-SCNC: 97 MMOL/L (ref 98–107)
CHOLEST SERPL-MCNC: 149 MG/DL (ref 0–200)
CO2 SERPL-SCNC: 29.3 MMOL/L (ref 22–29)
CREAT SERPL-MCNC: 1.05 MG/DL (ref 0.76–1.27)
EGFRCR SERPLBLD CKD-EPI 2021: 89.8 ML/MIN/1.73
GLOBULIN UR ELPH-MCNC: 2.8 GM/DL
GLUCOSE SERPL-MCNC: 88 MG/DL (ref 65–99)
HDLC SERPL-MCNC: 31 MG/DL (ref 40–60)
LDLC SERPL CALC-MCNC: 91 MG/DL (ref 0–100)
LDLC/HDLC SERPL: 2.81 {RATIO}
POTASSIUM SERPL-SCNC: 4.2 MMOL/L (ref 3.5–5.2)
PROT SERPL-MCNC: 7.2 G/DL (ref 6–8.5)
SODIUM SERPL-SCNC: 138 MMOL/L (ref 136–145)
TRIGL SERPL-MCNC: 154 MG/DL (ref 0–150)
VLDLC SERPL-MCNC: 27 MG/DL (ref 5–40)

## 2022-07-05 PROCEDURE — 36415 COLL VENOUS BLD VENIPUNCTURE: CPT

## 2022-07-05 PROCEDURE — 80061 LIPID PANEL: CPT

## 2022-07-05 PROCEDURE — 80053 COMPREHEN METABOLIC PANEL: CPT

## 2023-11-21 ENCOUNTER — OFFICE VISIT (OUTPATIENT)
Dept: ORTHOPEDIC SURGERY | Facility: CLINIC | Age: 43
End: 2023-11-21
Payer: COMMERCIAL

## 2023-11-21 VITALS — HEIGHT: 76 IN | BODY MASS INDEX: 38.36 KG/M2 | HEART RATE: 86 BPM | WEIGHT: 315 LBS | OXYGEN SATURATION: 98 %

## 2023-11-21 DIAGNOSIS — M25.512 LEFT SHOULDER PAIN, UNSPECIFIED CHRONICITY: Primary | ICD-10-CM

## 2023-11-21 DIAGNOSIS — M75.82 ROTATOR CUFF TENDONITIS, LEFT: ICD-10-CM

## 2023-11-21 RX ORDER — AMLODIPINE BESYLATE 10 MG/1
1 TABLET ORAL DAILY
COMMUNITY

## 2023-11-21 RX ORDER — NAPROXEN SODIUM 550 MG/1
1 TABLET ORAL 2 TIMES DAILY
COMMUNITY

## 2023-11-21 RX ORDER — BUSPIRONE HYDROCHLORIDE 15 MG/1
1 TABLET ORAL 3 TIMES DAILY
COMMUNITY

## 2023-11-21 RX ORDER — TRAMADOL HYDROCHLORIDE 50 MG/1
TABLET ORAL
COMMUNITY
Start: 2023-10-05

## 2023-11-21 RX ORDER — DULOXETIN HYDROCHLORIDE 20 MG/1
CAPSULE, DELAYED RELEASE ORAL
COMMUNITY

## 2023-11-21 RX ORDER — TRIAMCINOLONE ACETONIDE 40 MG/ML
40 INJECTION, SUSPENSION INTRA-ARTICULAR; INTRAMUSCULAR
Status: COMPLETED | OUTPATIENT
Start: 2023-11-21 | End: 2023-11-21

## 2023-11-21 RX ORDER — QUETIAPINE FUMARATE 50 MG/1
TABLET, FILM COATED ORAL
COMMUNITY

## 2023-11-21 RX ORDER — METOPROLOL SUCCINATE 50 MG/1
1 TABLET, EXTENDED RELEASE ORAL 2 TIMES DAILY
COMMUNITY

## 2023-11-21 RX ORDER — LIDOCAINE HYDROCHLORIDE 10 MG/ML
5 INJECTION, SOLUTION INFILTRATION; PERINEURAL
Status: COMPLETED | OUTPATIENT
Start: 2023-11-21 | End: 2023-11-21

## 2023-11-21 RX ORDER — CHLORCYCLIZINE HYDROCHLORIDE AND PSEUDOEPHEDRINE HYDROCHLORIDE 25; 60 MG/1; MG/1
TABLET ORAL
COMMUNITY

## 2023-11-21 RX ORDER — LISINOPRIL AND HYDROCHLOROTHIAZIDE 20; 12.5 MG/1; MG/1
2 TABLET ORAL DAILY
COMMUNITY
Start: 2021-03-05

## 2023-11-21 RX ADMIN — TRIAMCINOLONE ACETONIDE 40 MG: 40 INJECTION, SUSPENSION INTRA-ARTICULAR; INTRAMUSCULAR at 08:29

## 2023-11-21 RX ADMIN — LIDOCAINE HYDROCHLORIDE 5 ML: 10 INJECTION, SOLUTION INFILTRATION; PERINEURAL at 08:29

## 2023-11-21 NOTE — PROGRESS NOTES
"Chief Complaint  Pain and Initial Evaluation of the Left Shoulder     Subjective      Sky Reeves presents to [facility] for initial evaluation of the left shoulder. He has had pain for a couple of months.  He had no injury or fall.  He has not ever had shoulder problems before this. He has full ROM just stiff with movement.     No Known Allergies     Social History     Socioeconomic History    Marital status:    Tobacco Use    Smoking status: Never    Smokeless tobacco: Never        I reviewed the patient's chief complaint, history of present illness, review of systems, past medical history, surgical history, family history, social history, medications, and allergy list.     Review of Systems     Constitutional: Denies fevers, chills, weight loss  Cardiovascular: Denies chest pain, shortness of breath  Skin: Denies rashes, acute skin changes  Neurologic: Denies headache, loss of consciousness        Vital Signs:   Pulse 86   Ht 193 cm (76\")   Wt (!) 143 kg (316 lb)   SpO2 98%   BMI 38.46 kg/m²          Physical Exam  General: Alert. No acute distress    Ortho Exam        LEFT SHOULDER Forward flexion 170. Abduction 170. External rotation 60. Internal rotation L3. Positive Cross body adduction. Supraspinatus strength 5/5. Infraspinatus Strength 5/5. Infrared subscap 5/5. Positive Stanley. Positive Neer. Negative Apprehension. Negative Lift off. (Negative Obriens. Sensation intact to light touch, median, radial, ulnar nerve. Positive AIN, PIN, ulnar nerve motor. Positive pulses. Positive Impingement signs. Good strength in triceps, biceps, deltoid, wrist extensors and wrist flexors.  Tightness with internal rotation.  Positive drop arm and Empty Can Test      Large Joint Arthrocentesis  Date/Time: 11/21/2023 8:29 AM  Consent given by: patient  Site marked: site marked  Timeout: Immediately prior to procedure a time out was called to verify the correct patient, procedure, " equipment, support staff and site/side marked as required   Supporting Documentation  Indications: pain   Procedure Details  Location: shoulder (left) -   Needle gauge: 21g.  Medications administered: 5 mL lidocaine 1 %; 40 mg triamcinolone acetonide 40 MG/ML  Patient tolerance: patient tolerated the procedure well with no immediate complications        Imaging Results (Most Recent)       Procedure Component Value Units Date/Time    XR Scapula Left [[id number]] Resulted: 11/21/23 0806     Updated: 11/21/23 0812             Result Review :     X-Ray Report:  Left scapula X-Ray  Indication: Evaluation of the left scapula  AP/Lateral view(s)  Findings: No fractures or dislocation.   Prior studies available for comparison: no        Assessment and Plan     Diagnoses and all orders for this visit:    1. Left shoulder pain, unspecified chronicity (Primary)  -     XR Scapula Left    2. Rotator cuff tendonitis, left        Discussed the treatment plan with the patient. I reviewed the X-rays that were obtained today with the patient.     Discussed the risks and benefits of conservative measures. The patient expressed understanding and wished to proceed with a left shoulder steroid injection.  He tolerated the injection well.     Work note given.     Call or return if worsening symptoms.    Follow Up     PRN      Patient was given instructions and counseling regarding his condition or for health maintenance advice. Please see specific information pulled into the AVS if appropriate.     Scribed for Henry Laura MD by Lizzy Morales MA.  11/21/23   08:01 EST    I have personally performed the services described in this document as scribed by the above individual and it is both accurate and complete. Henry Laura MD 11/21/23

## 2024-08-22 ENCOUNTER — OFFICE VISIT (OUTPATIENT)
Dept: ORTHOPEDIC SURGERY | Facility: CLINIC | Age: 44
End: 2024-08-22
Payer: COMMERCIAL

## 2024-08-22 VITALS
HEART RATE: 98 BPM | DIASTOLIC BLOOD PRESSURE: 92 MMHG | WEIGHT: 315 LBS | HEIGHT: 78 IN | OXYGEN SATURATION: 91 % | SYSTOLIC BLOOD PRESSURE: 160 MMHG | BODY MASS INDEX: 36.45 KG/M2

## 2024-08-22 DIAGNOSIS — M25.512 LEFT SHOULDER PAIN, UNSPECIFIED CHRONICITY: Primary | ICD-10-CM

## 2024-08-22 DIAGNOSIS — M75.42 IMPINGEMENT SYNDROME OF LEFT SHOULDER: ICD-10-CM

## 2024-08-22 RX ADMIN — LIDOCAINE HYDROCHLORIDE 5 ML: 10 INJECTION, SOLUTION INFILTRATION; PERINEURAL at 15:49

## 2024-08-22 RX ADMIN — TRIAMCINOLONE ACETONIDE 40 MG: 40 INJECTION, SUSPENSION INTRA-ARTICULAR; INTRAMUSCULAR at 15:49

## 2024-08-22 NOTE — PROGRESS NOTES
"Chief Complaint  Initial Evaluation of the Left Shoulder     Subjective      Sky Reeves presents to [facility] for an evaluation of left shoulder. 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 He also has been carrying a lot of water buckets as well. He saw his PCP who gave him an injection and states it gave him minimal relief but in the past an injection by me gave him relief.    No Known Allergies     Social History     Socioeconomic History    Marital status:    Tobacco Use    Smoking status: Never     Passive exposure: Never    Smokeless tobacco: Never   Vaping Use    Vaping status: Never Used   Substance and Sexual Activity    Alcohol use: Never    Drug use: Not Currently    Sexual activity: Defer        I reviewed the patient's chief complaint, history of present illness, review of systems, past medical history, surgical history, family history, social history, medications, and allergy list.     Review of Systems     Constitutional: Denies fevers, chills, weight loss  Cardiovascular: Denies chest pain, shortness of breath  Skin: Denies rashes, acute skin changes  Neurologic: Denies headache, loss of consciousness        Vital Signs:   /92   Pulse 98   Ht 198.1 cm (78\")   Wt (!) 145 kg (320 lb)   SpO2 91%   BMI 36.98 kg/m²          Physical Exam  General: Alert. No acute distress    Ortho Exam        LEFT SHOULDER EXAM:   Palpation: Non-tender clavicle. Non-tender to palpation of the anterior and posterior shoulder. Non-tender scapula.  ROM: Full extension and flexion. Full internal and external rotation. Normal wrist and elbow range of motion.  Strength: Good tone of deltoid, biceps, triceps, wrist extensors and wrist flexors.   Special Tests: Positive for impingement testing.Stable to varus/valgus test of the elbow.  Other: Sensation grossly intact. Neurovascular intact. Radial pulse 2+. Ulnar pulse 2+. No signs of " swelling, skin discoloration or atrophy.      Large Joint Arthrocentesis  Date/Time: 8/22/2024 3:49 PM  Consent given by: patient  Site marked: site marked  Timeout: Immediately prior to procedure a time out was called to verify the correct patient, procedure, equipment, support staff and site/side marked as required   Supporting Documentation  Indications: pain   Procedure Details  Location: shoulder (LEFT) -   Needle gauge: 21 G.  Medications administered: 5 mL lidocaine 1 %; 40 mg triamcinolone acetonide 40 MG/ML  Patient tolerance: patient tolerated the procedure well with no immediate complications        This injection documentation was Scribed for Henry Laura MD by Teri Brand.  08/22/24   15:50 EDT    Imaging Results (Most Recent)       Procedure Component Value Units Date/Time    XR Scapula Left [[id number]] Resulted: 08/22/24 1531     Updated: 08/22/24 1534             Result Review :     X-Ray Report:  Study: X-rays ordered, taken in the office, and reviewed today  Indication: Left shoulder pain  View: AP/Lateral view(s)  Findings: No acute fractures or dislocations noted.  Prior studies available for comparison: no     Assessment and Plan     Diagnoses and all orders for this visit:    1. Left shoulder pain, unspecified chronicity (Primary)  -     XR Scapula Left    2. Impingement syndrome of left shoulder    Other orders  -     Large Joint Arthrocentesis        Discussed risks and benefits of a left shoulder injection with the patient, he states injection in the past by me provided relief and wishes to proceed. He tolerated this well.     If he fails to improve with injection, will discuss obtaining an MRI.    Call or return if worsening symptoms.    Follow Up     4 weeks      Patient was given instructions and counseling regarding his condition or for health maintenance advice. Please see specific information pulled into the AVS if appropriate.     Transcribed for Henry Laura MD by Ibis  Esme  08/22/24   15:49 EDT    I have personally performed the services described in this document as scribed by the above individual and it is both accurate and complete. Henry Laura MD 08/23/24

## 2024-08-23 RX ORDER — TRIAMCINOLONE ACETONIDE 40 MG/ML
40 INJECTION, SUSPENSION INTRA-ARTICULAR; INTRAMUSCULAR
Status: COMPLETED | OUTPATIENT
Start: 2024-08-22 | End: 2024-08-22

## 2024-08-23 RX ORDER — LIDOCAINE HYDROCHLORIDE 10 MG/ML
5 INJECTION, SOLUTION INFILTRATION; PERINEURAL
Status: COMPLETED | OUTPATIENT
Start: 2024-08-22 | End: 2024-08-22

## 2024-11-19 ENCOUNTER — TELEPHONE (OUTPATIENT)
Dept: ORTHOPEDIC SURGERY | Facility: CLINIC | Age: 44
End: 2024-11-19
Payer: COMMERCIAL

## 2024-11-19 DIAGNOSIS — M75.42 IMPINGEMENT SYNDROME OF LEFT SHOULDER: Primary | ICD-10-CM

## 2024-11-19 NOTE — TELEPHONE ENCOUNTER
Caller: EDILIA   Relationship to Patient: SELF  Phone Number [phone]  Reason for Call: PATIENT WANTING AN ORDER FOR A LEFT SHOULDER MRI STATES IT IS STILL BOTHERING HIM

## 2025-01-03 ENCOUNTER — HOSPITAL ENCOUNTER (OUTPATIENT)
Dept: MRI IMAGING | Facility: HOSPITAL | Age: 45
Discharge: HOME | End: 2025-01-03
Admitting: ORTHOPAEDIC SURGERY
Payer: COMMERCIAL

## 2025-01-03 DIAGNOSIS — M75.42 IMPINGEMENT SYNDROME OF LEFT SHOULDER: ICD-10-CM

## 2025-01-03 PROCEDURE — 73221 MRI JOINT UPR EXTREM W/O DYE: CPT

## 2025-01-28 ENCOUNTER — OFFICE VISIT (OUTPATIENT)
Dept: ORTHOPEDIC SURGERY | Facility: CLINIC | Age: 45
End: 2025-01-28
Payer: COMMERCIAL

## 2025-01-28 VITALS
BODY MASS INDEX: 36.45 KG/M2 | DIASTOLIC BLOOD PRESSURE: 79 MMHG | SYSTOLIC BLOOD PRESSURE: 132 MMHG | OXYGEN SATURATION: 92 % | HEART RATE: 94 BPM | WEIGHT: 315 LBS | HEIGHT: 78 IN

## 2025-01-28 DIAGNOSIS — M75.42 IMPINGEMENT SYNDROME OF LEFT SHOULDER: ICD-10-CM

## 2025-01-28 DIAGNOSIS — M25.511 RIGHT SHOULDER PAIN, UNSPECIFIED CHRONICITY: ICD-10-CM

## 2025-01-28 DIAGNOSIS — M75.41 IMPINGEMENT SYNDROME OF RIGHT SHOULDER: ICD-10-CM

## 2025-01-28 DIAGNOSIS — M25.512 LEFT SHOULDER PAIN, UNSPECIFIED CHRONICITY: Primary | ICD-10-CM

## 2025-01-28 RX ORDER — TRIAMCINOLONE ACETONIDE 40 MG/ML
40 INJECTION, SUSPENSION INTRA-ARTICULAR; INTRAMUSCULAR
Status: COMPLETED | OUTPATIENT
Start: 2025-01-28 | End: 2025-01-28

## 2025-01-28 RX ORDER — LIDOCAINE HYDROCHLORIDE 10 MG/ML
5 INJECTION, SOLUTION INFILTRATION; PERINEURAL
Status: COMPLETED | OUTPATIENT
Start: 2025-01-28 | End: 2025-01-28

## 2025-01-28 RX ADMIN — LIDOCAINE HYDROCHLORIDE 5 ML: 10 INJECTION, SOLUTION INFILTRATION; PERINEURAL at 13:16

## 2025-01-28 RX ADMIN — TRIAMCINOLONE ACETONIDE 40 MG: 40 INJECTION, SUSPENSION INTRA-ARTICULAR; INTRAMUSCULAR at 13:16

## 2025-01-28 NOTE — PROGRESS NOTES
"Chief Complaint  Follow-up of the Left Shoulder and Initial Evaluation of the Right Shoulder     Subjective      Sky Reeves presents to [facility] for follow up of the left shoulder.  He had a MRI of the left shoulder on 1/3/25 and is here to review. He had a left shoulder steroid injection on 8/22/24.  He has a physical job.  He noted the injections don't give much relief.  He has pain with forward and upward ROM of the shoulder.  He has had no recent injury or fall.  He is also having pain in the right shoulder.      No Known Allergies     Social History     Socioeconomic History    Marital status:    Tobacco Use    Smoking status: Never     Passive exposure: Never    Smokeless tobacco: Never   Vaping Use    Vaping status: Never Used   Substance and Sexual Activity    Alcohol use: Never    Drug use: Not Currently    Sexual activity: Defer        I reviewed the patient's chief complaint, history of present illness, review of systems, past medical history, surgical history, family history, social history, medications, and allergy list.     Review of Systems     Constitutional: Denies fevers, chills, weight loss  Cardiovascular: Denies chest pain, shortness of breath  Skin: Denies rashes, acute skin changes  Neurologic: Denies headache, loss of consciousness      Vital Signs:   /79   Pulse 94   Ht 198.1 cm (78\")   Wt (!) 145 kg (320 lb)   SpO2 92%   BMI 36.98 kg/m²          Physical Exam  General: Alert. No acute distress    Ortho Exam        BILATERAL  SHOULDER Forward flexion 170 with pain at end ROM. Abduction 100 with pain. External rotation 50. Internal rotation L3. Positive Cross body adduction. Supraspinatus strength 4+/5. Infraspinatus Strength 5/5. Infrared subscap 5/5. Positive Stanley. Positive Neer. Negative Apprehension. Negative Lift off. (Negative Obriens. Sensation intact to light touch, median, radial, ulnar nerve. Positive AIN, PIN, ulnar nerve " motor. Positive pulses. Positive Impingement signs. Good strength in triceps, biceps, deltoid, wrist extensors and wrist flexors. Tender to palpation to the anterior aspect of the shoulder and down the arm.  Pain with empty can testing.         Large Joint Arthrocentesis: R subacromial bursa  Date/Time: 1/28/2025 1:16 PM  Consent given by: patient  Site marked: site marked  Timeout: Immediately prior to procedure a time out was called to verify the correct patient, procedure, equipment, support staff and site/side marked as required   Supporting Documentation  Indications: pain   Procedure Details  Location: shoulder - R subacromial bursa  Needle gauge: 21 G.  Medications administered: 5 mL lidocaine 1 %; 40 mg triamcinolone acetonide 40 MG/ML  Patient tolerance: patient tolerated the procedure well with no immediate complications      Large Joint Arthrocentesis: L subacromial bursa  Date/Time: 1/28/2025 1:16 PM  Consent given by: patient  Site marked: site marked  Timeout: Immediately prior to procedure a time out was called to verify the correct patient, procedure, equipment, support staff and site/side marked as required   Supporting Documentation  Indications: pain   Procedure Details  Location: shoulder - L subacromial bursa  Needle gauge: 21 G.  Medications administered: 5 mL lidocaine 1 %; 40 mg triamcinolone acetonide 40 MG/ML  Patient tolerance: patient tolerated the procedure well with no immediate complications      This injection documentation was Scribed for Henry Laura MD by William Vallejo.  01/28/25   13:16 EST      Imaging Results (Most Recent)       None             Result Review :         MRI Shoulder Left Without Contrast    Result Date: 1/6/2025  Narrative: MRI SHOULDER LEFT WO CONTRAST Date of Exam: 1/3/2025 3:32 PM EST Indication: left shoulder pain.  Comparison: None available. Technique:  Routine multiplanar/multisequence images of the left shoulder were obtained without contrast  administration.  Findings: Mild changes of tendinopathy are noted involving the rotator cuff. There is evidence for small focal partial-thickness bursal surface and intrasubstance tear involving the distal anterior fibers of the supraspinatus component. No definitive full-thickness perforation is seen. There is no retraction of torn fibers. The biceps anchor is intact. There is no evidence for complete biceps tendon tear or distal retraction. Abnormal signal involves the proximal intra-articular biceps tendon suggesting tendinopathy and possible partial thickness tear. The tendon is noted within the expected position in the intertubercular sulcus. There is abnormal signal involving the near entirety of the labrum. The findings suggest a SLAP type IX tear. There is evidence for an associated paralabral cyst along the posterior margin of the labrum measuring approximately 1.4 x 0.8 cm. The glenohumeral joint is intact. Mild changes of osteoarthritis are noted. There is no joint effusion. No significant subacromial/subdeltoid bursal collection is seen. The acromioclavicular joint is intact. Moderate hypertrophic age-related changes are noted. No abnormal bone marrow signal seen. The cortical margins are grossly intact. The surrounding soft tissues are unremarkable.     Impression: Impression: 1.Evidence for small focal partial-thickness bursal surface and intrasubstance tear involving the distal anterior fibers of the supraspinatus component of the cuff. No definitive full-thickness perforation is seen. 2.Evidence for tendinopathy and possible partial thickness tear involving the proximal intra-articular biceps tendon. 3.Evidence for a SLAP type IX tear. There is an associated paralabral cyst along the posterior margin of the labrum measuring up to 1.4 cm. 4.Mild osteoarthritis of the glenohumeral joint. Moderate hypertrophic age-related changes of the acromioclavicular joint are also noted. Electronically Signed:  Khanh Perkins MD  1/6/2025 10:14 AM EST  Workstation ID: [id number]            Assessment and Plan     Diagnoses and all orders for this visit:    1. Left shoulder pain, unspecified chronicity (Primary)    2. Right shoulder pain, unspecified chronicity    3. Impingement syndrome of right shoulder    4. Impingement syndrome of left shoulder        Discussed the treatment plan with the patient. I reviewed the MRI results with the patient.     Discussed the treatment options with the patient, operative vs non-operative. The patient is a candidate for a left shoulder arthroscopy whenever he is ready.      Discussed modification of activity at work and home.      Discussed the risks and benefits of conservative measures. The patient expressed understanding and wished to proceed with bilateral shoulder steroid injections.  He tolerated the injections well.     Discussed with the patient that due to the steroid injection given today in the office they may see an increase in blood sugar for a few days. Advised patient to monitor sugar after receiving the injection.     Discussed possibility of a reaction from the injection.  Discussed the possibility that the injection may not completely improve or remove the pain.  Discussed the risk of infection.    HEP exercises given.  Continue anti inflammatory.      Call or return if worsening symptoms.    Follow Up     PRN      Patient was given instructions and counseling regarding his condition or for health maintenance advice. Please see specific information pulled into the AVS if appropriate.     Scribed for Henry Laura MD by Lizzy Morales MA.  01/28/25   12:55 EST    I have personally performed the services described in this document as scribed by the above individual and it is both accurate and complete. Henry Laura MD 01/28/25

## 2025-04-01 ENCOUNTER — OFFICE VISIT (OUTPATIENT)
Dept: ORTHOPEDIC SURGERY | Facility: CLINIC | Age: 45
End: 2025-04-01
Payer: COMMERCIAL

## 2025-04-01 VITALS
WEIGHT: 145 LBS | HEART RATE: 105 BPM | DIASTOLIC BLOOD PRESSURE: 97 MMHG | SYSTOLIC BLOOD PRESSURE: 150 MMHG | BODY MASS INDEX: 16.78 KG/M2 | HEIGHT: 78 IN | OXYGEN SATURATION: 92 %

## 2025-04-01 DIAGNOSIS — M25.512 LEFT SHOULDER PAIN, UNSPECIFIED CHRONICITY: Primary | ICD-10-CM

## 2025-04-01 DIAGNOSIS — M75.42 IMPINGEMENT SYNDROME OF LEFT SHOULDER: ICD-10-CM

## 2025-04-01 NOTE — PROGRESS NOTES
"Chief Complaint  Follow-up of the Left Shoulder     Subjective      Sky Reeves presents to [facility] for follow up of the left shoulder.  He has a physical job. He noted the injections don't give much relief. He has pain with forward and upward ROM of the shoulder. He has had no recent injury or fall. He is also having pain in the right shoulder. His last injection was 1/28/25.  The injections only last about 3 weeks.  He notes the pain is affecting his daily tasks and ADL's.      No Known Allergies     Social History     Socioeconomic History    Marital status:    Tobacco Use    Smoking status: Never     Passive exposure: Never    Smokeless tobacco: Never   Vaping Use    Vaping status: Never Used   Substance and Sexual Activity    Alcohol use: Never    Drug use: Not Currently    Sexual activity: Defer        I reviewed the patient's chief complaint, history of present illness, review of systems, past medical history, surgical history, family history, social history, medications, and allergy list.     Review of Systems     Constitutional: Denies fevers, chills, weight loss  Cardiovascular: Denies chest pain, shortness of breath  Skin: Denies rashes, acute skin changes  Neurologic: Denies headache, loss of consciousness        Vital Signs:   /97   Pulse 105   Ht 198.1 cm (78\")   Wt 65.8 kg (145 lb)   SpO2 92%   BMI 16.76 kg/m²          Physical Exam  General: Alert. No acute distress    Ortho Exam        LEFT SHOULDER Forward flexion 170 with pain at end ROM. Abduction 100 with pain. External rotation 50. Internal rotation L3. Positive Cross body adduction. Supraspinatus strength 4+/5. Infraspinatus Strength 5/5. Infrared subscap 5/5. Positive Stanley. Positive Neer. Negative Apprehension. Negative Lift off. (Negative Obriens. Sensation intact to light touch, median, radial, ulnar nerve. Positive AIN, PIN, ulnar nerve motor. Positive pulses. Positive Impingement " signs. Good strength in triceps, biceps, deltoid, wrist extensors and wrist flexors. Tender to palpation to the anterior aspect of the shoulder and down the arm.  Pain with empty can testing.               Procedures      Imaging Results (Most Recent)       None             Result Review :          MRI Shoulder Left Without Contrast     Result Date: 1/6/2025  Narrative: MRI SHOULDER LEFT WO CONTRAST Date of Exam: 1/3/2025 3:32 PM EST Indication: left shoulder pain.  Comparison: None available. Technique:  Routine multiplanar/multisequence images of the left shoulder were obtained without contrast administration.  Findings: Mild changes of tendinopathy are noted involving the rotator cuff. There is evidence for small focal partial-thickness bursal surface and intrasubstance tear involving the distal anterior fibers of the supraspinatus component. No definitive full-thickness perforation is seen. There is no retraction of torn fibers. The biceps anchor is intact. There is no evidence for complete biceps tendon tear or distal retraction. Abnormal signal involves the proximal intra-articular biceps tendon suggesting tendinopathy and possible partial thickness tear. The tendon is noted within the expected position in the intertubercular sulcus. There is abnormal signal involving the near entirety of the labrum. The findings suggest a SLAP type IX tear. There is evidence for an associated paralabral cyst along the posterior margin of the labrum measuring approximately 1.4 x 0.8 cm. The glenohumeral joint is intact. Mild changes of osteoarthritis are noted. There is no joint effusion. No significant subacromial/subdeltoid bursal collection is seen. The acromioclavicular joint is intact. Moderate hypertrophic age-related changes are noted. No abnormal bone marrow signal seen. The cortical margins are grossly intact. The surrounding soft tissues are unremarkable.      Impression: Impression: 1.Evidence for small focal  partial-thickness bursal surface and intrasubstance tear involving the distal anterior fibers of the supraspinatus component of the cuff. No definitive full-thickness perforation is seen. 2.Evidence for tendinopathy and possible partial thickness tear involving the proximal intra-articular biceps tendon. 3.Evidence for a SLAP type IX tear. There is an associated paralabral cyst along the posterior margin of the labrum measuring up to 1.4 cm. 4.Mild osteoarthritis of the glenohumeral joint. Moderate hypertrophic age-related changes of the acromioclavicular joint are also noted. Electronically Signed: Khanh Perkins MD  1/6/2025 10:14 AM EST  Workstation ID: [id number]       Assessment and Plan     Diagnoses and all orders for this visit:    1. Left shoulder pain, unspecified chronicity (Primary)    2. Impingement syndrome of left shoulder        Discussed the treatment plan with the patient. I reviewed the MRI results with the patient.     Discussed the treatment options with the patient, operative vs non-operative. The patient is a candidate of a left shoulder arthroscopy.     He can call back to schedule.      Discussed surgery. and Call or return if worsening symptoms.    Follow Up     PRN      Patient was given instructions and counseling regarding his condition or for health maintenance advice. Please see specific information pulled into the AVS if appropriate.     Scribed for Henry Laura MD by Lizzy Morales MA.  04/01/25   15:50 EDT    I have personally performed the services described in this document as scribed by the above individual and it is both accurate and complete. Henry Laura MD 04/02/25

## 2025-04-11 ENCOUNTER — TELEPHONE (OUTPATIENT)
Dept: ORTHOPEDIC SURGERY | Facility: CLINIC | Age: 45
End: 2025-04-11
Payer: COMMERCIAL

## 2025-04-11 NOTE — TELEPHONE ENCOUNTER
PATIENT CALLED AND REQUESTED SURGERY TO BE SCHEDULE FOR LEFT SHOULDER- LVM FOR PATIENT THAT A REQUEST HAS BEEN SENT TO  TO SUBMIT CASE REQUEST - SOMEONE WILL CALL TO SCHEDULE

## 2025-04-14 ENCOUNTER — PREP FOR SURGERY (OUTPATIENT)
Dept: OTHER | Facility: HOSPITAL | Age: 45
End: 2025-04-14
Payer: COMMERCIAL

## 2025-04-14 DIAGNOSIS — M75.42 IMPINGEMENT SYNDROME OF LEFT SHOULDER: Primary | ICD-10-CM

## 2025-05-08 ENCOUNTER — TELEPHONE (OUTPATIENT)
Dept: ORTHOPEDIC SURGERY | Facility: CLINIC | Age: 45
End: 2025-05-08
Payer: COMMERCIAL

## 2025-05-08 DIAGNOSIS — M75.41 IMPINGEMENT SYNDROME OF RIGHT SHOULDER: ICD-10-CM

## 2025-05-08 DIAGNOSIS — M25.511 RIGHT SHOULDER PAIN, UNSPECIFIED CHRONICITY: Primary | ICD-10-CM

## 2025-05-08 NOTE — TELEPHONE ENCOUNTER
Provider:  DR. EMILE BUTT    Caller: EDILIA KOHLER    Relationship to Patient: SELF    Pharmacy:  [facility] [phone]    Phone Number: [phone]    Reason for Call:  PATIENT IS SCHEDULED FOR LEFT SHOULDER SURGERY 5/28/25. PATIENT GOT INJECTIONS IN BOTH RIGHT AND LEFT SHOULDERS 1/8/25 THAT ONLY HELPED X 2 WEEKS. PATIENT IS ASKING IF DR. BUTT WOULD ORDER AN MRI OF HIS RIGHT SHOULDER. PATIENT SAYS HE'S MEETING HIS OUT OF POCKET AND WOULD LIKE TO TRY TO GET THE TEST DONE NOW.    When was the patient last seen: 4/1/25

## 2025-05-20 ENCOUNTER — TELEPHONE (OUTPATIENT)
Dept: ORTHOPEDIC SURGERY | Facility: CLINIC | Age: 45
End: 2025-05-20
Payer: COMMERCIAL

## 2025-05-20 DIAGNOSIS — M75.82 ROTATOR CUFF TENDONITIS, LEFT: ICD-10-CM

## 2025-05-20 DIAGNOSIS — M75.42 IMPINGEMENT SYNDROME OF LEFT SHOULDER: Primary | ICD-10-CM

## 2025-05-20 NOTE — TELEPHONE ENCOUNTER
PATIENT IS SCHEDULED FOR LEFT SHOULDER SURGERY ON 5/28/2025. PART OF THE PROCEDURE IS DENIED, PER DR. BUTT HE WANTS PATIENT TO TRY OUTPATIENT PT FOR 3 WEEKS AND THEN FOLLOW BACK UP IN THE OFFICE.     I SPOKE WITH PATIENT AND HE WISHES TO PROCEED WITH OUTPATIENT PT FOR 3 WEEKS. PATIENT WISHES TO TRY OUTPATIENT PT ON BILATERAL SHOULDERS. PATIENT WAS SEEN ON 4/1/2025 FOR BILATERAL SHOULDERS.     PATIENT WISHES TO GO TO [facility] IN [facility].      THANKS!   MARY ANN

## 2025-05-21 DIAGNOSIS — M75.41 IMPINGEMENT SYNDROME OF RIGHT SHOULDER: ICD-10-CM

## 2025-05-21 DIAGNOSIS — M75.42 IMPINGEMENT SYNDROME OF LEFT SHOULDER: Primary | ICD-10-CM

## 2025-06-30 ENCOUNTER — TREATMENT (OUTPATIENT)
Dept: PHYSICAL THERAPY | Facility: CLINIC | Age: 45
End: 2025-06-30
Payer: COMMERCIAL

## 2025-06-30 DIAGNOSIS — M25.512 CHRONIC PAIN OF BOTH SHOULDERS: Primary | ICD-10-CM

## 2025-06-30 DIAGNOSIS — M25.511 CHRONIC PAIN OF BOTH SHOULDERS: Primary | ICD-10-CM

## 2025-06-30 DIAGNOSIS — M77.8 TENDONITIS OF BOTH SHOULDERS: ICD-10-CM

## 2025-06-30 DIAGNOSIS — G89.29 CHRONIC PAIN OF BOTH SHOULDERS: Primary | ICD-10-CM

## 2025-06-30 DIAGNOSIS — R29.898 WEAKNESS OF BOTH SHOULDERS: ICD-10-CM

## 2025-06-30 PROCEDURE — 97110 THERAPEUTIC EXERCISES: CPT

## 2025-06-30 PROCEDURE — 97140 MANUAL THERAPY 1/> REGIONS: CPT

## 2025-06-30 PROCEDURE — 97535 SELF CARE MNGMENT TRAINING: CPT

## 2025-06-30 PROCEDURE — 97161 PT EVAL LOW COMPLEX 20 MIN: CPT

## 2025-06-30 NOTE — PROGRESS NOTES
Physical Therapy Initial Evaluation and Plan of Care                       Patient: Sky Reeves   : 1980  Diagnosis/ICD-10 Code:  Chronic pain of both shoulders [M25.511, G89.29, M25.512]  Referring practitioner: Henry Laura MD  Date of Initial Visit: 2025  Today's Date: 2025  Patient seen for 1 sessions           Subjective Questionnaire: QuickDASH: 24      Subjective Evaluation    History of Present Illness  Mechanism of injury: Patient reports 2 years of bilateral shoulder pain. Insurance denies him SAD surgery until he has PT. He reports repeated injections for the shoulder. He denies injury. He reports increased pain with any use of the arm, nothing specifically. He reports sleep disturbance from both shoulders, and only gets 1 hour of sleep before waking but probably gets 8 hours total. He did used to have numbness in the left hand a few months ago but none recently.     Pain  Current pain ratin  Quality: dull ache, tight and discomfort  Relieving factors: medications  Aggravating factors: lifting, movement, sleeping, repetitive movement, outstretched reach and overhead activity  Progression: improved    Hand dominance: right    Diagnostic Tests  MRI studies: abnormal    Treatments  Previous treatment: injection treatment  Patient Goals  Patient goals for therapy: increased strength, decreased pain, increased motion, return to sport/leisure activities and independence with ADLs/IADLs             Objective          Active Range of Motion   Left Shoulder   Flexion: 155 degrees   Abduction: 145 degrees   External rotation 0°: 65 degrees   Internal rotation BTB: L2 with pain    Right Shoulder   Flexion: 165 degrees with pain  Abduction: 140 degrees with pain  External rotation 0°: 79 degrees   Internal rotation BTB: L2 with pain    Additional Active Range of Motion Details  175/172 shoulder flexion bilaterally following AP mobs    Joint Play   Left Shoulder  Hypomobile in the posterior  capsule and long axis distraction.    Right Shoulder  Hypomobile in the posterior capsule and long axis distraction.     Strength/Myotome Testing     Left Shoulder     Planes of Motion   Flexion: 4   Abduction: 4   External rotation at 90°: 4-   Internal rotation at 90°: 5     Right Shoulder     Planes of Motion   Flexion: 4-   Abduction: 5   External rotation at 90°: 4-   Internal rotation at 90°: 5     Tests     Left Shoulder   Positive empty can, full can and Hawkin's.   Negative horn blower, lift-off, painful arc and Speed's.     Right Shoulder   Positive full can and Hawkin's.   Negative empty can, horn blower, lift-off, painful arc and Speed's.       See Exercise, Manual, and Modality Logs for complete treatment.     Assessment & Plan       Assessment  Impairments: abnormal muscle firing, abnormal muscle tone, abnormal or restricted ROM, activity intolerance, impaired physical strength, lacks appropriate home exercise program, pain with function and safety issue   Functional limitations: carrying objects, lifting, pulling, pushing, reaching behind back, reaching overhead and unable to perform repetitive tasks   Assessment details: The patient presents to physical therapy with complaints of bilateral shoulder pain. Signs and symptoms are consistent with rotator cuff tendonitis bilaterally with capsular restrictions. He would benefit from skilled physical therapy as described below to address these limitations and allow the patient to return to his prior level of function.    Prognosis: good    Goals  Plan Goals: SHOULDER  PROBLEMS:     1. The patient has limited ROM of the shoulder.    LTG 1: 12 weeks:  The patient will demonstrate 165 degrees of shoulder flexion, 165 degrees of shoulder abduction, 65 degrees of shoulder external rotation, and 65 degrees of shoulder internal rotation to allow the patient to reach into upper kitchen cabinets and manipulate clothing behind the back with greater ease.    STATUS:   New   STG 1a: 6 weeks:  The patient will demonstrate 155 degrees of shoulder flexion, 155 degrees of shoulder abduction, 55 degrees of shoulder external rotation, and 45 degrees of shoulder internal rotation.    STATUS:  New    2. The patient has limited strength of the shoulder.   LTG 2: 12 weeks:  The patient will demonstrate 5 /5 strength for shoulder flexion, abduction, external rotation, and internal rotation in order to demonstrate improved shoulder stability.    STATUS:  New   STG 2a: 6 weeks:  The patient will demonstrate 4 /5 strength for shoulder flexion, abduction, external rotation, and internal rotation.    STATUS:  New   STG2b:  6 weeks:  The patient will be independent with home exercises.     STATUS:  New     3. The patient complains of pain to the shoulder.   LTG 3: 12 weeks:  The patient will report a pain rating of 1 /10 or better in order to improve sleep quality and tolerance to performance of activities of daily living.    STATUS:  New   STG 3a: 6 weeks:  The patient will report a pain rating of 4 /10 or better.     STATUS:  New    4. Carrying, Moving, and Handling Objects Functional Limitation     LTG 4: 12 weeks:  The patient will demonstrate improved function by achieving a score of 14 on the QuickDASH.    STATUS:  New   STG 4a: 6 weeks:  The patient will demonstrate improved function by achieving a score of 19 on the QuickDASH.      STATUS:  New     Plan  Therapy options: will be seen for skilled therapy services  Planned modality interventions: cryotherapy, electrical stimulation/Russian stimulation and TENS  Planned therapy interventions: ADL retraining, soft tissue mobilization, strengthening, stretching, therapeutic activities, joint mobilization, home exercise program, functional ROM exercises, flexibility, body mechanics training, postural training, neuromuscular re-education, manual therapy and motor coordination training  Frequency: 2x week  Duration in weeks: 12  Treatment plan  discussed with: patient        Visit Diagnoses:    ICD-10-CM ICD-9-CM   1. Chronic pain of both shoulders  M25.511 719.41    G89.29 338.29    M25.512    2. Tendonitis of both shoulders  M77.8 726.10   3. Weakness of both shoulders  R29.898 781.99       History # of Personal Factors and/or Comorbidities: LOW (0)  Examination of Body System(s): # of elements: LOW (1-2)  Clinical Presentation: STABLE   Clinical Decision Making: LOW       Timed:         Manual Therapy:    10     mins  00080;     Therapeutic Exercise:    23     mins  42265;     Neuromuscular Gordo:    0    mins  05790;    Therapeutic Activity:     0     mins  21951;     Gait Trainin     mins  98233;     Ultrasound:     0     mins  73194;    Ionto                               0    mins   94925  Self Care                       10     mins   88896  Canalith Repos    0     mins 62697      Un-Timed:  Electrical Stimulation:    0     mins  17055 (MC );  Dry Needling     0     mins self-pay  Traction     0     mins 97981  Low Eval     20     Mins  56242  Mod Eval     0     Mins  15893  High Eval                       0     Mins  19953  Re-Eval                           0    mins  75011    Timed Treatment:   43   mins   Total Treatment:     63   mins    PT SIGNATURE: Manjinder Soriano PT    Electronically signed 2025    [place] License: PT - [id number]      Initial Certification  Certification Period: 2025 thru 2025  I certify that the therapy services are furnished while this patient is under my care.  The services outlined above are required by this patient, and will be reviewed every 90 days.     PHYSICIAN: Henry Laura MD  NPI: [id number]      DATE:     Please sign and return via fax to [phone]. Thank you, [facility].

## 2025-07-08 ENCOUNTER — TREATMENT (OUTPATIENT)
Dept: PHYSICAL THERAPY | Facility: CLINIC | Age: 45
End: 2025-07-08
Payer: COMMERCIAL

## 2025-07-08 DIAGNOSIS — R29.898 WEAKNESS OF BOTH SHOULDERS: ICD-10-CM

## 2025-07-08 DIAGNOSIS — M25.512 CHRONIC PAIN OF BOTH SHOULDERS: Primary | ICD-10-CM

## 2025-07-08 DIAGNOSIS — M25.511 CHRONIC PAIN OF BOTH SHOULDERS: Primary | ICD-10-CM

## 2025-07-08 DIAGNOSIS — M77.8 TENDONITIS OF BOTH SHOULDERS: ICD-10-CM

## 2025-07-08 DIAGNOSIS — G89.29 CHRONIC PAIN OF BOTH SHOULDERS: Primary | ICD-10-CM

## 2025-07-08 PROCEDURE — 97140 MANUAL THERAPY 1/> REGIONS: CPT

## 2025-07-08 PROCEDURE — 97110 THERAPEUTIC EXERCISES: CPT

## 2025-07-08 NOTE — PROGRESS NOTES
Physical Therapy Daily Treatment Note                          Patient: Sky Reeves   : 1980  Diagnosis/ICD-10 Code:  Chronic pain of both shoulders [M25.511, G89.29, M25.512]  Referring practitioner: Henry Laura MD  Date of Initial Visit: Type: THERAPY  Noted: 2025  Today's Date: 2025  Patient seen for 2 sessions           Subjective   The patient reported his HEP is going well, he is still having pain in the shoulders.     Objective   See Exercise, Manual, and Modality Logs for complete treatment.     Assessment/Plan     Patient tolerated today's treatment without complaints of pain. Improvements noted in ROM and pain with IR and flexion following ADLs. Strength, ROM, and increased pain with activity deficits still limits patient's ability to perform ADLs. Further skilled care indicated at this time.     Timed:  Manual Therapy:    14     mins  50558;  Therapeutic Exercise:    10     mins  33370;     Neuromuscular Gordo:   0    mins  70280;    Therapeutic Activity:     0     mins  30452;     Gait Trainin     mins  52613;     Aquatics                         0      mins  07044    Un-timed:  Mechanical Traction      0     mins  18171  Dry Needling     0     mins self-pay  Electrical Stimulation:    0     mins  69947 ( );      Timed Treatment:   24   mins   Total Treatment:     24   mins    Manjinder Soriano PT  Physical Therapist    Electronically signed 2025    [place] License: PT - [id number]

## 2025-07-16 ENCOUNTER — TREATMENT (OUTPATIENT)
Dept: PHYSICAL THERAPY | Facility: CLINIC | Age: 45
End: 2025-07-16
Payer: COMMERCIAL

## 2025-07-16 DIAGNOSIS — M25.511 CHRONIC PAIN OF BOTH SHOULDERS: Primary | ICD-10-CM

## 2025-07-16 DIAGNOSIS — G89.29 CHRONIC PAIN OF BOTH SHOULDERS: Primary | ICD-10-CM

## 2025-07-16 DIAGNOSIS — M77.8 TENDONITIS OF BOTH SHOULDERS: ICD-10-CM

## 2025-07-16 DIAGNOSIS — M25.512 CHRONIC PAIN OF BOTH SHOULDERS: Primary | ICD-10-CM

## 2025-07-16 DIAGNOSIS — R29.898 WEAKNESS OF BOTH SHOULDERS: ICD-10-CM

## 2025-07-16 NOTE — PROGRESS NOTES
Physical Therapy Daily Treatment Note                          Patient: Sky Reeves   : 1980  Diagnosis/ICD-10 Code:  Chronic pain of both shoulders [M25.511, G89.29, M25.512]  Referring practitioner: Henry Laura MD  Date of Initial Visit: Type: THERAPY  Noted: 2025  Today's Date: 2025  Patient seen for 3 sessions           Subjective   The patient reported overall improvement and he has started doing exercises every other day due to soreness.    Objective   See Exercise, Manual, and Modality Logs for complete treatment.     Assessment/Plan     Patient tolerated today's treatment without complaints of pain. Improvements noted in ROM overall in all planes, especially flexion. Strength, ROM, and increased pain with activity deficits still limits patient's ability to perform ADLs. Further skilled care indicated at this time.       Timed:  Manual Therapy:    10     mins  41555;  Therapeutic Exercise:    10     mins  12027;     Neuromuscular Gordo:   0    mins  23566;    Therapeutic Activity:     9     mins  42244;     Gait Trainin     mins  61493;     Aquatics                         0      mins  26784    Un-timed:  Mechanical Traction      0     mins  04129  Dry Needling     0     mins self-pay  Electrical Stimulation:    0     mins  52193 ( );      Timed Treatment:   29   mins   Total Treatment:     29   mins    Manjinder Soriano PT  Physical Therapist    Electronically signed 2025    [place] License: PT - [id number]

## 2025-07-23 ENCOUNTER — TREATMENT (OUTPATIENT)
Dept: PHYSICAL THERAPY | Facility: CLINIC | Age: 45
End: 2025-07-23
Payer: COMMERCIAL

## 2025-07-23 DIAGNOSIS — M77.8 TENDONITIS OF BOTH SHOULDERS: ICD-10-CM

## 2025-07-23 DIAGNOSIS — M25.512 CHRONIC PAIN OF BOTH SHOULDERS: Primary | ICD-10-CM

## 2025-07-23 DIAGNOSIS — G89.29 CHRONIC PAIN OF BOTH SHOULDERS: Primary | ICD-10-CM

## 2025-07-23 DIAGNOSIS — R29.898 WEAKNESS OF BOTH SHOULDERS: ICD-10-CM

## 2025-07-23 DIAGNOSIS — M25.511 CHRONIC PAIN OF BOTH SHOULDERS: Primary | ICD-10-CM

## 2025-07-23 PROCEDURE — 97140 MANUAL THERAPY 1/> REGIONS: CPT

## 2025-07-23 NOTE — PROGRESS NOTES
Physical Therapy Daily Treatment Note/Discharge                          Patient: Sky Reeves   : 1980  Diagnosis/ICD-10 Code:  Chronic pain of both shoulders [M25.511, G89.29, M25.512]  Referring practitioner: Henry Laura MD  Date of Initial Visit: Type: THERAPY  Noted: 2025  Today's Date: 2025  Patient seen for 4 sessions           Subjective   The patient reported he feels he can discharge today and continue via home program.     Objective   See Exercise, Manual, and Modality Logs for complete treatment.     Assessment/Plan     Patient has met therapy goals and is appropriate to discharge to home program at this time.       Timed:  Manual Therapy:    24     mins  47595;  Therapeutic Exercise:    0     mins  82018;     Neuromuscular Gordo:   0    mins  67191;    Therapeutic Activity:     0     mins  50031;     Gait Trainin     mins  00358;     Aquatics                         0      mins  74580    Un-timed:  Mechanical Traction      0     mins  19986  Dry Needling     0     mins self-pay  Electrical Stimulation:    0     mins  55256 ( );      Timed Treatment:   24   mins   Total Treatment:     24   mins    Manjinder Soriano PT  Physical Therapist    Electronically signed 2025    [place] License: PT - [id number]

## 2025-08-19 ENCOUNTER — OFFICE VISIT (OUTPATIENT)
Dept: ORTHOPEDIC SURGERY | Facility: CLINIC | Age: 45
End: 2025-08-19
Payer: COMMERCIAL

## 2025-08-19 VITALS — OXYGEN SATURATION: 98 % | DIASTOLIC BLOOD PRESSURE: 82 MMHG | HEART RATE: 91 BPM | SYSTOLIC BLOOD PRESSURE: 135 MMHG

## 2025-08-19 DIAGNOSIS — M75.41 IMPINGEMENT SYNDROME OF RIGHT SHOULDER: ICD-10-CM

## 2025-08-19 DIAGNOSIS — M75.42 IMPINGEMENT SYNDROME OF LEFT SHOULDER: Primary | ICD-10-CM

## 2025-08-19 RX ADMIN — LIDOCAINE HYDROCHLORIDE 5 ML: 10 INJECTION, SOLUTION INFILTRATION; PERINEURAL at 15:46

## 2025-08-19 RX ADMIN — TRIAMCINOLONE ACETONIDE 40 MG: 40 INJECTION, SUSPENSION INTRA-ARTICULAR; INTRAMUSCULAR at 15:46

## 2025-08-20 RX ORDER — LIDOCAINE HYDROCHLORIDE 10 MG/ML
5 INJECTION, SOLUTION INFILTRATION; PERINEURAL
Status: COMPLETED | OUTPATIENT
Start: 2025-08-19 | End: 2025-08-19

## 2025-08-20 RX ORDER — TRIAMCINOLONE ACETONIDE 40 MG/ML
40 INJECTION, SUSPENSION INTRA-ARTICULAR; INTRAMUSCULAR
Status: COMPLETED | OUTPATIENT
Start: 2025-08-19 | End: 2025-08-19